# Patient Record
Sex: MALE | Race: OTHER
[De-identification: names, ages, dates, MRNs, and addresses within clinical notes are randomized per-mention and may not be internally consistent; named-entity substitution may affect disease eponyms.]

---

## 2019-11-04 ENCOUNTER — HOSPITAL ENCOUNTER (OUTPATIENT)
Dept: HOSPITAL 72 - PAN | Age: 64
Discharge: HOME | End: 2019-11-04
Payer: MEDICAID

## 2019-11-04 VITALS — WEIGHT: 260 LBS | BODY MASS INDEX: 35.21 KG/M2 | HEIGHT: 72 IN

## 2019-11-04 VITALS — DIASTOLIC BLOOD PRESSURE: 78 MMHG | SYSTOLIC BLOOD PRESSURE: 128 MMHG

## 2019-11-04 DIAGNOSIS — M79.7: ICD-10-CM

## 2019-11-04 DIAGNOSIS — R14.0: ICD-10-CM

## 2019-11-04 DIAGNOSIS — R19.7: ICD-10-CM

## 2019-11-04 DIAGNOSIS — R10.9: Primary | ICD-10-CM

## 2019-11-04 DIAGNOSIS — K21.9: ICD-10-CM

## 2019-11-04 DIAGNOSIS — Z90.5: ICD-10-CM

## 2019-11-05 NOTE — CONSULTATION
DATE OF CONSULTATION:  11/04/2019

CONSULTING PHYSICIAN:  Parag Sprague M.D.



CHIEF COMPLAINT:  Abdominal pain, GERD, diarrhea, and bloating.



PAST MEDICAL HISTORY:  Hemorrhoids, fibromyalgia.



PAST SURGICAL HISTORY:  Kidney removal.



MEDICATIONS:  Please see medication reconciliation list.



FAMILY HISTORY:  No family history of GI malignancies.



SOCIAL HISTORY:  The patient drinks two or three drinks per week.  He used

to smoke cigarettes, but he quit.  No drug abuse.



ALLERGIES:  No known drug allergy.



REVIEW OF SYSTEMS:  Positive for abdominal pain, GERD, diarrhea, and

bloating.



PHYSICAL EXAMINATION:

VITAL SIGNS:  Temperature 97, blood pressure 122/78, pulse _____, and

respirations 20.

HEENT:  Normocephalic and atraumatic.  Sclerae anicteric.

NECK:  Supple.  No evidence of obvious lymphadenopathy.

CARDIOVASCULAR:  Regular rate and rhythm.  Plus S1 and S2.  No obvious

murmur.

LUNGS:  Clear to auscultation bilaterally.

ABDOMEN:  Positive bowel sounds.  Soft and nontender.  No rebound.  No

guarding.  No peritoneal sign.

EXTREMITIES:  No cyanosis.  No clubbing.  No edema.



ASSESSMENT AND PLAN:  This is a 64-year-old male with chronic GERD, also

needs a screening colonoscopy.  Last colonoscopy over 5 years ago

according to him, and he had polyps according to him.  Plan is for the

patient to get an endoscopy and colonoscopy.  Risks and benefits of

procedure was explained to him.  Colonoscopy prep was given to him.  We

are waiting for authorization to schedule him.









  ______________________________________________

  Parag Sprague M.D.





DR:  KASIA

D:  11/05/2019 11:47

T:  11/05/2019 17:12

JOB#:  [id number]

CC: